# Patient Record
Sex: FEMALE | URBAN - METROPOLITAN AREA
[De-identification: names, ages, dates, MRNs, and addresses within clinical notes are randomized per-mention and may not be internally consistent; named-entity substitution may affect disease eponyms.]

---

## 2020-12-21 ENCOUNTER — PREPPED CHART (OUTPATIENT)
Dept: URBAN - METROPOLITAN AREA CLINIC 49 | Facility: CLINIC | Age: 65
End: 2020-12-21

## 2020-12-21 PROBLEM — Z79.899 OTHER LONG TERM (CURRENT) DRUG THERAPY: Noted: 2020-12-21

## 2020-12-21 PROBLEM — H44.23 DEGENERATIVE MYOPIA: Noted: 2020-12-21

## 2020-12-21 PROBLEM — H43.393 VITREOUS FLOATERS: Noted: 2020-12-21

## 2020-12-21 PROBLEM — H43.813 POSTERIOR VITREOUS DETACHMENT: Noted: 2020-12-21

## 2020-12-21 PROBLEM — H04.123 DRY EYE SYNDROME: Noted: 2020-12-21

## 2020-12-21 PROBLEM — H04.129 DRY EYE SYNDROME: Noted: 2020-12-21

## 2022-07-13 ASSESSMENT — VISUAL ACUITY
OD_SC: 20/30
OS_SC: 20/150

## 2022-07-13 ASSESSMENT — TONOMETRY: OD_IOP_MMHG: 14

## 2022-08-05 ENCOUNTER — APPOINTMENT (RX ONLY)
Dept: URBAN - METROPOLITAN AREA CLINIC 41 | Facility: CLINIC | Age: 67
Setting detail: DERMATOLOGY
End: 2022-08-05

## 2022-08-05 DIAGNOSIS — L27.0 GENERALIZED SKIN ERUPTION DUE TO DRUGS AND MEDICAMENTS TAKEN INTERNALLY: ICD-10-CM | Status: INADEQUATELY CONTROLLED

## 2022-08-05 PROCEDURE — ? PRESCRIPTION MEDICATION MANAGEMENT

## 2022-08-05 PROCEDURE — 99203 OFFICE O/P NEW LOW 30 MIN: CPT

## 2022-08-05 PROCEDURE — ? COUNSELING

## 2022-08-05 PROCEDURE — ? ADDITIONAL NOTES

## 2022-08-05 PROCEDURE — ? PRESCRIPTION

## 2022-08-05 RX ORDER — PREDNISONE 20 MG/1
TABLET ORAL
Qty: 42 | Refills: 0 | Status: ERX

## 2022-08-05 RX ORDER — TRIAMCINOLONE ACETONIDE 1 MG/G
OINTMENT TOPICAL
Qty: 454 | Refills: 0 | Status: ERX | COMMUNITY
Start: 2022-08-05

## 2022-08-05 RX ORDER — HYDROXYZINE HYDROCHLORIDE 25 MG/1
TABLET, FILM COATED ORAL
Qty: 60 | Refills: 1 | Status: ERX

## 2022-08-05 RX ADMIN — TRIAMCINOLONE ACETONIDE: 1 OINTMENT TOPICAL at 00:00

## 2022-08-05 ASSESSMENT — LOCATION DETAILED DESCRIPTION DERM: LOCATION DETAILED: LEFT SUPERIOR MEDIAL UPPER BACK

## 2022-08-05 ASSESSMENT — LOCATION SIMPLE DESCRIPTION DERM: LOCATION SIMPLE: LEFT UPPER BACK

## 2022-08-05 ASSESSMENT — LOCATION ZONE DERM: LOCATION ZONE: TRUNK

## 2022-08-05 NOTE — PROCEDURE: COUNSELING
Detail Level: Generalized
Patient Specific Counseling (Will Not Stick From Patient To Patient): DK notes this is presenting like a drug eruption today. Pt reports she started cymbalta one month ago and rash presented around one week ago. Pt has discontinued the cymbalta on Monday. DK counsels typically when discontinuing medication causing the reaction the rash should improve. Pt reports no improvement since discontinuing.

## 2022-08-05 NOTE — HPI: RASH
Is This A New Presentation, Or A Follow-Up?: Rash
Additional History: New pt. Notes rash all over body, taking prednisone, cetirizine, hydroxyzine. Notes meds not working. History of systemic lupus.

## 2022-08-05 NOTE — PROCEDURE: PRESCRIPTION MEDICATION MANAGEMENT
Modify Regimen: Hydroxyzine 25mg 1 po at 8pm then 1 po at 12am
Initiate Treatment: Prednisone 20mg, 3 po QAM x 1 week, then 2 po QAM x 1 week, then 1 po QAM x 1 week\\nTriamcinolone .1% ointment bid
Detail Level: Zone
Render In Strict Bullet Format?: No

## 2022-08-05 NOTE — PROCEDURE: ADDITIONAL NOTES
Additional Notes: Patient consent was obtained to proceed with the visit and recommended plan of care after discussion of all risks and benefits, including the risks of COVID-19 exposure.\\n\\nPt advised to check in with pcp about meds Rx today
Detail Level: Simple

## 2022-08-18 ENCOUNTER — FOLLOW UP (OUTPATIENT)
Dept: URBAN - METROPOLITAN AREA CLINIC 49 | Facility: CLINIC | Age: 67
End: 2022-08-18

## 2022-08-18 DIAGNOSIS — H44.23: ICD-10-CM

## 2022-08-18 DIAGNOSIS — H43.393: ICD-10-CM

## 2022-08-18 DIAGNOSIS — H43.813: ICD-10-CM

## 2022-08-18 DIAGNOSIS — H35.383: ICD-10-CM

## 2022-08-18 DIAGNOSIS — Z79.899: ICD-10-CM

## 2022-08-18 PROCEDURE — 92202 OPSCPY EXTND ON/MAC DRAW: CPT | Mod: NC

## 2022-08-18 PROCEDURE — 92274 MULTIFOCAL ERG W/I&R: CPT

## 2022-08-18 PROCEDURE — 92250 FUNDUS PHOTOGRAPHY W/I&R: CPT

## 2022-08-18 PROCEDURE — 92134 CPTRZ OPH DX IMG PST SGM RTA: CPT

## 2022-08-18 PROCEDURE — 92014 COMPRE OPH EXAM EST PT 1/>: CPT

## 2022-08-18 ASSESSMENT — VISUAL ACUITY
OS_PH: 20/25
OS_SC: 20/150
OD_SC: 20/20-2

## 2022-08-18 ASSESSMENT — TONOMETRY
OD_IOP_MMHG: 12
OS_IOP_MMHG: 13

## 2022-08-22 ENCOUNTER — APPOINTMENT (RX ONLY)
Dept: URBAN - METROPOLITAN AREA CLINIC 41 | Facility: CLINIC | Age: 67
Setting detail: DERMATOLOGY
End: 2022-08-22

## 2022-08-22 DIAGNOSIS — L27.0 GENERALIZED SKIN ERUPTION DUE TO DRUGS AND MEDICAMENTS TAKEN INTERNALLY: ICD-10-CM | Status: INADEQUATELY CONTROLLED

## 2022-08-22 DIAGNOSIS — L21.8 OTHER SEBORRHEIC DERMATITIS: ICD-10-CM | Status: INADEQUATELY CONTROLLED

## 2022-08-22 PROCEDURE — 99214 OFFICE O/P EST MOD 30 MIN: CPT

## 2022-08-22 PROCEDURE — ? PRESCRIPTION MEDICATION MANAGEMENT

## 2022-08-22 PROCEDURE — ? ADDITIONAL NOTES

## 2022-08-22 PROCEDURE — ? PRESCRIPTION

## 2022-08-22 PROCEDURE — ? COUNSELING

## 2022-08-22 RX ORDER — KETOCONAZOLE 20 MG/ML
SHAMPOO, SUSPENSION TOPICAL
Qty: 240 | Refills: 2 | Status: ERX | COMMUNITY
Start: 2022-08-22

## 2022-08-22 RX ORDER — CLOBETASOL PROPIONATE 0.5 MG/ML
SOLUTION TOPICAL
Qty: 50 | Refills: 2 | Status: ERX | COMMUNITY
Start: 2022-08-22

## 2022-08-22 RX ADMIN — KETOCONAZOLE: 20 SHAMPOO, SUSPENSION TOPICAL at 00:00

## 2022-08-22 RX ADMIN — CLOBETASOL PROPIONATE: 0.5 SOLUTION TOPICAL at 00:00

## 2022-08-22 ASSESSMENT — LOCATION SIMPLE DESCRIPTION DERM
LOCATION SIMPLE: LEFT UPPER BACK
LOCATION SIMPLE: RIGHT SCALP

## 2022-08-22 ASSESSMENT — LOCATION ZONE DERM
LOCATION ZONE: TRUNK
LOCATION ZONE: SCALP

## 2022-08-22 ASSESSMENT — LOCATION DETAILED DESCRIPTION DERM
LOCATION DETAILED: LEFT SUPERIOR MEDIAL UPPER BACK
LOCATION DETAILED: RIGHT MEDIAL FRONTAL SCALP

## 2022-08-22 NOTE — PROCEDURE: PRESCRIPTION MEDICATION MANAGEMENT
Detail Level: Zone
Render In Strict Bullet Format?: No
Plan: Taper both\\nTriamcinolone .1% ointment bid\\nHydroxyzine 25mg 1 po at 8pm then 1 po at 12am

## 2022-08-22 NOTE — PROCEDURE: COUNSELING
Detail Level: Generalized
Patient Specific Counseling (Will Not Stick From Patient To Patient): DK notes that this can take some time to resolve. Pt notes that she has some swelling of the legs, DK counsels that Pt should visit pcp.
Detail Level: Detailed

## 2022-09-22 ENCOUNTER — APPOINTMENT (RX ONLY)
Dept: URBAN - METROPOLITAN AREA CLINIC 41 | Facility: CLINIC | Age: 67
Setting detail: DERMATOLOGY
End: 2022-09-22

## 2022-09-22 DIAGNOSIS — L81.4 OTHER MELANIN HYPERPIGMENTATION: ICD-10-CM | Status: STABLE

## 2022-09-22 DIAGNOSIS — L21.8 OTHER SEBORRHEIC DERMATITIS: ICD-10-CM | Status: STABLE

## 2022-09-22 DIAGNOSIS — L27.0 GENERALIZED SKIN ERUPTION DUE TO DRUGS AND MEDICAMENTS TAKEN INTERNALLY: ICD-10-CM | Status: RESOLVING

## 2022-09-22 PROCEDURE — ? PRESCRIPTION MEDICATION MANAGEMENT

## 2022-09-22 PROCEDURE — ? COUNSELING

## 2022-09-22 PROCEDURE — ? ADDITIONAL NOTES

## 2022-09-22 PROCEDURE — 99214 OFFICE O/P EST MOD 30 MIN: CPT

## 2022-09-22 PROCEDURE — ? TREATMENT REGIMEN

## 2022-09-22 ASSESSMENT — LOCATION ZONE DERM
LOCATION ZONE: TRUNK
LOCATION ZONE: SCALP

## 2022-09-22 ASSESSMENT — LOCATION DETAILED DESCRIPTION DERM
LOCATION DETAILED: RIGHT MEDIAL FRONTAL SCALP
LOCATION DETAILED: LEFT SUPERIOR MEDIAL UPPER BACK

## 2022-09-22 ASSESSMENT — LOCATION SIMPLE DESCRIPTION DERM
LOCATION SIMPLE: RIGHT SCALP
LOCATION SIMPLE: LEFT UPPER BACK

## 2022-09-22 NOTE — PROCEDURE: COUNSELING
Detail Level: Generalized
Patient Specific Counseling (Will Not Stick From Patient To Patient): DK notes that the legs can take some time to resolve. DK recommends using a good moisturizer.
Detail Level: Detailed
Patient Specific Counseling (Will Not Stick From Patient To Patient): Pt reports this after plaquenil use. This may be hard to treat
Detail Level: Zone

## 2022-09-22 NOTE — PROCEDURE: PRESCRIPTION MEDICATION MANAGEMENT
Render In Strict Bullet Format?: No
Continue Regimen: Ketoconazole shampoo\\nClobetasol solution
Detail Level: Zone

## 2023-02-23 ENCOUNTER — FOLLOW UP (OUTPATIENT)
Dept: URBAN - METROPOLITAN AREA CLINIC 49 | Facility: CLINIC | Age: 68
End: 2023-02-23

## 2023-02-23 DIAGNOSIS — H43.813: ICD-10-CM

## 2023-02-23 DIAGNOSIS — Z79.899: ICD-10-CM

## 2023-02-23 DIAGNOSIS — H43.393: ICD-10-CM

## 2023-02-23 DIAGNOSIS — H35.383: ICD-10-CM

## 2023-02-23 PROCEDURE — 92202 OPSCPY EXTND ON/MAC DRAW: CPT

## 2023-02-23 PROCEDURE — 92014 COMPRE OPH EXAM EST PT 1/>: CPT

## 2023-02-23 PROCEDURE — 92134 CPTRZ OPH DX IMG PST SGM RTA: CPT

## 2023-02-23 ASSESSMENT — VISUAL ACUITY
OD_CC: 20/20
OS_CC: 20/30

## 2023-02-23 ASSESSMENT — TONOMETRY
OS_IOP_MMHG: 13
OD_IOP_MMHG: 11

## 2023-10-16 ENCOUNTER — PROBLEM (OUTPATIENT)
Dept: URBAN - METROPOLITAN AREA CLINIC 49 | Facility: CLINIC | Age: 68
End: 2023-10-16

## 2023-10-16 DIAGNOSIS — E11.9: ICD-10-CM

## 2023-10-16 DIAGNOSIS — H43.813: ICD-10-CM

## 2023-10-16 DIAGNOSIS — H44.23: ICD-10-CM

## 2023-10-16 DIAGNOSIS — H35.383: ICD-10-CM

## 2023-10-16 DIAGNOSIS — H04.123: ICD-10-CM

## 2023-10-16 DIAGNOSIS — Z79.899: ICD-10-CM

## 2023-10-16 PROCEDURE — 92134 CPTRZ OPH DX IMG PST SGM RTA: CPT

## 2023-10-16 PROCEDURE — 92202 OPSCPY EXTND ON/MAC DRAW: CPT

## 2023-10-16 PROCEDURE — 92014 COMPRE OPH EXAM EST PT 1/>: CPT

## 2023-10-16 ASSESSMENT — TONOMETRY
OD_IOP_MMHG: 14
OS_IOP_MMHG: 18

## 2023-10-16 ASSESSMENT — VISUAL ACUITY
OS_CC: 20/20-2
OD_CC: 20/20

## 2023-10-20 ENCOUNTER — DIAGNOSTICS ONLY (OUTPATIENT)
Dept: URBAN - METROPOLITAN AREA CLINIC 49 | Facility: CLINIC | Age: 68
End: 2023-10-20

## 2023-10-20 DIAGNOSIS — H35.383: ICD-10-CM

## 2023-10-20 DIAGNOSIS — Z79.899: ICD-10-CM

## 2023-10-20 PROCEDURE — 92274 MULTIFOCAL ERG W/I&R: CPT

## 2023-10-20 ASSESSMENT — VISUAL ACUITY
OD_CC: 20/20
OS_CC: 20/20-1

## 2023-10-20 ASSESSMENT — TONOMETRY
OD_IOP_MMHG: 15
OS_IOP_MMHG: 15

## 2024-10-07 ENCOUNTER — FOLLOW UP (OUTPATIENT)
Dept: URBAN - METROPOLITAN AREA CLINIC 49 | Facility: CLINIC | Age: 69
End: 2024-10-07

## 2024-10-07 DIAGNOSIS — H43.813: ICD-10-CM

## 2024-10-07 DIAGNOSIS — Z79.899: ICD-10-CM

## 2024-10-07 DIAGNOSIS — H44.23: ICD-10-CM

## 2024-10-07 DIAGNOSIS — H35.383: ICD-10-CM

## 2024-10-07 PROCEDURE — 92134 CPTRZ OPH DX IMG PST SGM RTA: CPT

## 2024-10-07 PROCEDURE — 92202 OPSCPY EXTND ON/MAC DRAW: CPT

## 2024-10-07 PROCEDURE — 92014 COMPRE OPH EXAM EST PT 1/>: CPT

## 2024-10-07 ASSESSMENT — TONOMETRY
OD_IOP_MMHG: 13
OS_IOP_MMHG: 17

## 2024-10-07 ASSESSMENT — VISUAL ACUITY
OD_CC: 20/25-1
OS_CC: 20/40-1

## 2024-11-15 ENCOUNTER — DIAGNOSTICS ONLY (OUTPATIENT)
Dept: URBAN - METROPOLITAN AREA CLINIC 49 | Facility: CLINIC | Age: 69
End: 2024-11-15

## 2024-11-15 ASSESSMENT — VISUAL ACUITY
OS_SC: 20/150
OS_PH: 20/60
OD_SC: 20/20-2

## 2024-11-15 ASSESSMENT — TONOMETRY
OS_IOP_MMHG: 14
OD_IOP_MMHG: 9